# Patient Record
Sex: MALE | Race: WHITE | NOT HISPANIC OR LATINO | URBAN - METROPOLITAN AREA
[De-identification: names, ages, dates, MRNs, and addresses within clinical notes are randomized per-mention and may not be internally consistent; named-entity substitution may affect disease eponyms.]

---

## 2017-05-18 ENCOUNTER — PREPPED CHART (OUTPATIENT)
Dept: URBAN - METROPOLITAN AREA CLINIC 39 | Facility: CLINIC | Age: 62
End: 2017-05-18

## 2018-02-07 NOTE — PATIENT DISCUSSION
Posterior Capsular Fibrosis Counseling: The diagnosis of posterior capsular fibrosis (PCF), also referred to as a secondary cataract or posterior capsular opacification (PCO), was discussed with the patient. The patient understands that their symptoms and limitations are likely related to this condition. I have reviewed the risks, benefits and alternatives of  YAG laser surgery for the treatment of the fibrosis. The uncommon risk of an increase in intraocular pressure or a retinal detachment and their associated symptoms were explained to the patient. The patient understands and desires to proceed with the laser surgery to improve their vision for _driving and tv___.

## 2018-05-17 ENCOUNTER — ESTABLISHED COMPREHENSIVE EXAM (OUTPATIENT)
Dept: URBAN - METROPOLITAN AREA CLINIC 39 | Facility: CLINIC | Age: 63
End: 2018-05-17

## 2018-05-17 VITALS — HEART RATE: 63 BPM | SYSTOLIC BLOOD PRESSURE: 112 MMHG | DIASTOLIC BLOOD PRESSURE: 68 MMHG | HEIGHT: 60 IN

## 2018-05-17 DIAGNOSIS — H25.812: ICD-10-CM

## 2018-05-17 DIAGNOSIS — H04.123: ICD-10-CM

## 2018-05-17 DIAGNOSIS — H25.811: ICD-10-CM

## 2018-05-17 PROCEDURE — 1036F TOBACCO NON-USER: CPT

## 2018-05-17 PROCEDURE — G8783 BP SCRN PERF REC INTERVAL: HCPCS

## 2018-05-17 PROCEDURE — G8427 DOCREV CUR MEDS BY ELIG CLIN: HCPCS

## 2018-05-17 PROCEDURE — 92014 COMPRE OPH EXAM EST PT 1/>: CPT

## 2018-05-17 PROCEDURE — 92015 DETERMINE REFRACTIVE STATE: CPT

## 2018-05-17 ASSESSMENT — VISUAL ACUITY
OS_CC: 20/30+2
OD_CC: 20/25-2
OS_CC: J2
OS_SC: 20/200
OD_CC: J1
OD_SC: J12
OD_SC: 20/200
OS_SC: J12

## 2018-05-17 ASSESSMENT — TONOMETRY
OD_IOP_MMHG: 12
OS_IOP_MMHG: 12

## 2020-09-25 NOTE — PATIENT DISCUSSION
- Follow up in February with Dr. Macie Wang for annual [facility]

## 2020-09-25 NOTE — PATIENT DISCUSSION
- Not visually or functionally significant, but have progressed significantly in the past 6 months, likely due to hyperbaric oxygen treatments over the summer

## 2021-03-12 NOTE — PATIENT DISCUSSION
AMD (DRY), OU: +FH. PRESCRIBE AREDS 2 VITAMINS / AMSLER GRID QD/ UV PROTECTION. SMOKING CESSATION EMPHASIZED. RETURN FOR FOLLOW-UP AS SCHEDULED.  CANNOT RULE OUT RADIATION RETINOPATHY

## 2022-03-14 NOTE — PATIENT DISCUSSION
Considering Surgery Counseling: I have discussed the option of scheduling surgery versus following, as well as the risks, benefits and alternatives of cataract surgery with the patient. It was explained that the surgery is elective, there is no rush and there is no harm in waiting to have surgery. It was also explained that there is no guarantee that removing the cataract will improve their vision.

## 2022-04-04 NOTE — PATIENT DISCUSSION
"AMD (Dry): I have instructed the patient it is not necessary to take an AREDS 2 vitamin mixture to minimize the risk of developing ""wet"" macular degeneration yet. The importance of daily monitoring with Amsler grid was emphasized. New persistent blurring or distortion of vision should be evaluated. "

## 2022-04-14 NOTE — PATIENT DISCUSSION
Likely visually significant. Trial monovision with a contact lens prior to cataract surgery. Placed a -3.00 acuvue in the right eye for distance, she will use her left eye for near. Consider cataract surgery if visual symptoms persist once the right eye is corrected for distance. Plan for monovision at the time of cataract surgery if she desires to proceed OD distance and OS near. Repeat IOLM with post myopic lasik in the right eye when she returns in 2 weeks.

## 2022-04-28 NOTE — PATIENT DISCUSSION
Pt loved wearing a contact lens for monovision using the right eye for distance  and using her natural near vision in the left eye. Pt desires to proceed with cataract surgery in the right eye for distance.

## 2022-04-28 NOTE — PATIENT DISCUSSION
Visually significant. Schedule cataract surgery OD. Hold off on surgery in the left eye for now. Pt feels she is doing well at near os.

## 2022-04-28 NOTE — PATIENT DISCUSSION
Discussed the Light Adjustable Lens (MARCUS) with the patient. Told this lens gives the most flexibility regarding postoperative refractive outcome as it allows a non-surgical modification of the refractive error leading to less dependence on glasses. Patient made aware that this may require several visits and that the results depend on wearing UV protective glasses for about 3-5 weeks after surgery. Patient informed that the MARCUS may not be implanted on some occasions and need to be substituted by another lens if the pupil is found not to adequately dilate on the day of surgery. Patient informed that this is an elective out of pocket option not covered by payors. Also clearly discussed that there is no need to go with this option if wearing corrective glasses is acceptable.

## 2022-05-05 NOTE — PATIENT DISCUSSION
Proceed with cataract surgery, pt chooses to proceed with a goal of plano using a single vision lens.

## 2022-05-05 NOTE — PATIENT DISCUSSION
Discussed findings, not visually significant at this time. She still feels comfortable reading and doing things she likes to do os. Monitor.

## 2022-05-12 ENCOUNTER — NEW PATIENT (OUTPATIENT)
Dept: URBAN - METROPOLITAN AREA CLINIC 35 | Facility: CLINIC | Age: 67
End: 2022-05-12

## 2022-05-12 DIAGNOSIS — H04.123: ICD-10-CM

## 2022-05-12 DIAGNOSIS — H25.813: ICD-10-CM

## 2022-05-12 PROCEDURE — 92015 DETERMINE REFRACTIVE STATE: CPT

## 2022-05-12 PROCEDURE — 92004 COMPRE OPH EXAM NEW PT 1/>: CPT

## 2022-05-12 ASSESSMENT — VISUAL ACUITY
OS_CC: J6
OS_CC: 20/30-2
OD_CC: J10
OU_CC: J10
OU_CC: 20/25-2
OD_CC: 20/25

## 2022-05-12 ASSESSMENT — TONOMETRY
OD_IOP_MMHG: 13
OS_IOP_MMHG: 12

## 2022-05-27 ENCOUNTER — APPOINTMENT (OUTPATIENT)
Dept: CT IMAGING | Facility: HOSPITAL | Age: 67
End: 2022-05-27

## 2022-05-27 ENCOUNTER — HOSPITAL ENCOUNTER (EMERGENCY)
Facility: HOSPITAL | Age: 67
Discharge: HOME | End: 2022-05-27
Attending: EMERGENCY MEDICINE | Admitting: EMERGENCY MEDICINE

## 2022-05-27 VITALS
RESPIRATION RATE: 16 BRPM | SYSTOLIC BLOOD PRESSURE: 148 MMHG | HEIGHT: 73 IN | DIASTOLIC BLOOD PRESSURE: 83 MMHG | TEMPERATURE: 98.1 F | WEIGHT: 243.61 LBS | OXYGEN SATURATION: 99 % | BODY MASS INDEX: 32.29 KG/M2 | HEART RATE: 84 BPM

## 2022-05-27 DIAGNOSIS — S30.0XXA TRAUMATIC HEMATOMA OF BUTTOCK, INITIAL ENCOUNTER: ICD-10-CM
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
## 2022-05-27 PROCEDURE — 72192 CT PELVIS W/O DYE: CPT

## 2022-05-27 PROCEDURE — 99283 EMERGENCY DEPT VISIT LOW MDM: CPT

## 2022-05-27 RX ORDER — HYDROCODONE BITARTRATE AND ACETAMINOPHEN 7.5; 325 MG/1; MG/1
1 TABLET ORAL EVERY 6 HOURS PRN
Qty: 12 TABLET | Refills: 0 | Status: SHIPPED | OUTPATIENT
Start: 2022-05-27

## 2022-05-27 RX ORDER — HYDROCODONE BITARTRATE AND ACETAMINOPHEN 7.5; 325 MG/1; MG/1
1 TABLET ORAL ONCE
Status: COMPLETED | OUTPATIENT
Start: 2022-05-27 | End: 2022-05-27

## 2022-05-27 RX ADMIN — HYDROCODONE BITARTRATE AND ACETAMINOPHEN 1 TABLET: 7.5; 325 TABLET ORAL at 13:56

## 2022-05-27 NOTE — DISCHARGE INSTRUCTIONS
Your CT scan showed no fractures or bony injuries, but you obviously have a large 11 x 3.7 cm hematoma in the right buttock.    I did speak with the orthopedic surgeon on-call, who recommends only conservative treatment with supportive care measures such as trying to stay off of that area when you are in a seated position and also applying ice packs and taking some medication for pain control.    The hematoma should gradually reduce in size as it gets reabsorbed over the next week or 2.    You can follow-up with your doctor in [place] when you get home as needed for further pain control.

## 2022-05-27 NOTE — ED PROVIDER NOTES
"Time: 1235  Arrived by: Private vehicle  Chief Complaint: Right buttock injury from fall downstairs  History provided by: Patient    History of Present Illness:  Patient is a 67 y.o. year old male that presents to the emergency department with report of falling down the stairs last night landing directly on his right buttocks, now presents today with severe right buttock pain and swelling and bruising.    The pain is worse when he tries to sit down on the right side, and feels somewhat better when he stays off of the right side of his buttocks.    No rectal bleeding reported and no head or neck or spine injury reported.    He has not on any blood thinning medications.    He was in his normal state of health prior to the fall last night, and is here visiting from [place], [place].    He denies any recent fevers or chills or chest pain or dyspnea or vomiting or diarrhea or cough or congestion.    Similar Symptoms Previously: No  Recently seen: No      Patient Care Team  Primary Care Provider: Primary care physician in [place], where he lives    Past Medical History:   Reviewed, not on any anticoagulant medications.    No Known Allergies  History reviewed. No pertinent past medical history.  History reviewed. No pertinent surgical history.  History reviewed. No pertinent family history.    Home Medications:  Prior to Admission medications    Not on File        Record Review:  I have reviewed the patient's records in 8bit.     Review of Systems:  Review of Systems   I performed a 10 point review of systems which was all negative, except for the positives found in the HPI above.  Physical Exam:  /83 (BP Location: Left arm, Patient Position: Sitting)   Pulse 84   Temp 98.1 °F (36.7 °C) (Oral)   Resp 16   Ht 185.4 cm (73\")   Wt 110 kg (243 lb 9.7 oz)   SpO2 99%   BMI 32.14 kg/m²     Physical Exam   General: Awake alert and in moderate distress due to pain    HEENT: Head normocephalic atraumatic, eyes PERRLA " EOMI, nose normal, oropharynx normal.    Neck: Supple full range of motion, no meningismus, no lymphadenopathy    Heart: Regular rate and rhythm, no murmurs or rubs, 2+ radial pulses bilaterally    Lungs: Clear to auscultation bilaterally without wheezes or crackles, no respiratory distress    Abdomen: Soft, nontender, nondistended, no rebound or guarding    Skin: Warm, dry, no rash    Musculoskeletal: Large traumatic hematoma of right buttock with significant edema and ecchymosis and tenderness of the right buttock noted.  No bony tenderness of the hip or thoracolumbar spine, and no tenderness down the right thigh or leg.  Normal neurovascular status of the right leg.    Neurologic: Oriented x3, no motor deficits no sensory deficits    Psychiatric: Mood appears stable, no psychosis        Medications in the Emergency Department:  Medications   HYDROcodone-acetaminophen (NORCO) 7.5-325 MG per tablet 1 tablet (1 tablet Oral Given 5/27/22 1356)        Labs  Lab Results (last 24 hours)     ** No results found for the last 24 hours. **           Imaging:  CT Pelvis Without Contrast    Result Date: 5/27/2022  PROCEDURE: CT PELVIS WO CONTRAST  COMPARISON: None  INDICATIONS: eval large R buttock hematoma for underlying pelvic fx from fall down stairs  PROTOCOL:   Standard imaging protocol performed    RADIATION:   DLP: 1570 mGy*cm   Automated exposure control was utilized to minimize radiation dose.  TECHNIQUE: After obtaining the patient's consent, CT images were created without intravenous contrast.  Multiplanar imaging was performed.  FINDINGS:  No fracture or malalignment is seen.  There is a large focus of abnormal soft tissue density consistent with a hematoma in the right buttock posteriorly.  It measures 11.1 cm x 3.7 cm .  No adenopathy or free fluid is seen in the pelvis.  Pelvic viscera appear normal.  Mild left and moderate right hip osteoarthrosis is noted.  Mild to moderate osteoarthrosis changes of the  sacroiliac joints is noted bilaterally.        1. 11.1 cm x 3.7 cm hematoma in the posterior right buttock 2. No fracture or malalignment 3. Degenerative changes of the bilateral hip joints and SI joints.     Deniz Barnett M.D.       Electronically Signed and Approved By: Deniz Barnett M.D. on 5/27/2022 at 13:31                Procedures:  Procedures    Progress                            Medical Decision Making:  MDM     This patient is a pleasant healthy-appearing 67-year-old male who is here visiting from [place] and last night woke up to go to the bathroom and slipped down a flight of stairs injuring his right buttock.    He now has a significantly large and tense hematoma of the right buttock with significant pain and swelling and bruising present.    He is not on any anticoagulant medications and no active bleeding or laceration is seen.    I am giving him hydrocodone for pain control and also obtaining CT imaging of the pelvis to rule out any underlying fracture that caused this hematoma.    CT imaging just shows 3.7 cm x 11 cm hematoma in the right buttock with edema, but no bony injury.    I have consulted with our on-call orthopedic surgeon regarding any further recommendations for management of this large hematoma, but no aspiration or drainage was recommended and just conservative measures including rest and application of ice packs and pain meds and anti-inflammatories this week.        Final diagnoses:   Fall down stairs, initial encounter   Traumatic hematoma of buttock, initial encounter        Disposition:  ED Disposition     ED Disposition   Discharge    Condition   Stable    Comment   --                    Harpal Hendricks MD  05/27/22 2777

## 2022-08-12 ENCOUNTER — FOLLOW UP (OUTPATIENT)
Dept: URBAN - METROPOLITAN AREA CLINIC 35 | Facility: CLINIC | Age: 67
End: 2022-08-12

## 2022-08-12 DIAGNOSIS — H25.813: ICD-10-CM

## 2022-08-12 PROCEDURE — 99211T TECH SERVICE

## 2022-10-31 ENCOUNTER — CONSULTATION/EVALUATION (OUTPATIENT)
Dept: URBAN - METROPOLITAN AREA CLINIC 35 | Facility: CLINIC | Age: 67
End: 2022-10-31

## 2022-10-31 DIAGNOSIS — H25.813: ICD-10-CM

## 2022-10-31 DIAGNOSIS — H04.123: ICD-10-CM

## 2022-10-31 PROCEDURE — 92136TC INTERFEROMETRY - TECHNICAL COMPONENT

## 2022-10-31 PROCEDURE — V2799PMN IMPRIMIS PRED-MOXI-NEPAF 5ML

## 2022-10-31 PROCEDURE — 92014 COMPRE OPH EXAM EST PT 1/>: CPT

## 2022-10-31 ASSESSMENT — VISUAL ACUITY
OD_BAT: 20/60
OS_CC: 20/40
OS_BAT: 20/70
OD_CC: 20/25
OU_CC: J6

## 2023-05-15 ENCOUNTER — COMPREHENSIVE EXAM (OUTPATIENT)
Dept: URBAN - METROPOLITAN AREA CLINIC 35 | Facility: CLINIC | Age: 68
End: 2023-05-15

## 2023-05-15 DIAGNOSIS — H25.813: ICD-10-CM

## 2023-05-15 PROCEDURE — 92015 DETERMINE REFRACTIVE STATE: CPT

## 2023-05-15 PROCEDURE — 92014 COMPRE OPH EXAM EST PT 1/>: CPT

## 2023-05-15 ASSESSMENT — VISUAL ACUITY
OD_BAT: 20/60
OU_CC: J6
OD_CC: J8
OD_CC: 20/20
OS_PH: 20/40
OS_CC: J8
OU_CC: 20/20-1
OS_CC: 20/200-

## 2023-05-15 ASSESSMENT — TONOMETRY
OD_IOP_MMHG: 14
OS_IOP_MMHG: 15

## 2023-05-22 ENCOUNTER — CONSULTATION/EVALUATION (OUTPATIENT)
Dept: URBAN - METROPOLITAN AREA CLINIC 35 | Facility: CLINIC | Age: 68
End: 2023-05-22

## 2023-05-22 DIAGNOSIS — H25.813: ICD-10-CM

## 2023-05-22 PROCEDURE — 92025 CPTRIZED CORNEAL TOPOGRAPHY: CPT

## 2023-05-22 PROCEDURE — 92014 COMPRE OPH EXAM EST PT 1/>: CPT

## 2023-05-22 PROCEDURE — 92136TC INTERFEROMETRY - TECHNICAL COMPONENT

## 2023-05-22 ASSESSMENT — VISUAL ACUITY
OS_SC: 20/60+2
OD_SC: <J14
OS_AM: 20/20
OD_SC: 20/200
OS_CC: J2
OS_CC: 20/70
OD_CC: 20/25-2
OD_CC: J2
OS_SC: J12

## 2023-05-22 ASSESSMENT — TONOMETRY
OD_IOP_MMHG: 14
OS_IOP_MMHG: 15

## 2023-07-19 ENCOUNTER — SURGERY/PROCEDURE (OUTPATIENT)
Dept: URBAN - METROPOLITAN AREA CLINIC 35 | Facility: CLINIC | Age: 68
End: 2023-07-19

## 2023-07-19 ENCOUNTER — PRE-OP/H&P (OUTPATIENT)
Dept: URBAN - METROPOLITAN AREA SURGERY 14 | Facility: SURGERY | Age: 68
End: 2023-07-19

## 2023-07-19 DIAGNOSIS — H25.813: ICD-10-CM

## 2023-07-19 PROCEDURE — 66984AV REMOVE CATARACT, INSERT ADVANCED LENS

## 2023-07-19 PROCEDURE — 66999LNSR LENSAR LASER FOR CAT SX

## 2023-07-19 PROCEDURE — 99211T TECH SERVICE

## 2023-07-19 PROCEDURE — 65772LRI LRI DURING CAT SX

## 2023-07-20 ENCOUNTER — POST-OP (OUTPATIENT)
Dept: URBAN - METROPOLITAN AREA CLINIC 39 | Facility: CLINIC | Age: 68
End: 2023-07-20

## 2023-07-20 DIAGNOSIS — H25.811: ICD-10-CM

## 2023-07-20 DIAGNOSIS — Z96.1: ICD-10-CM

## 2023-07-20 PROCEDURE — 99024 POSTOP FOLLOW-UP VISIT: CPT

## 2023-07-20 ASSESSMENT — VISUAL ACUITY
OS: J10
OS_SC: J2
OS_SC: 20/20

## 2023-07-20 ASSESSMENT — TONOMETRY: OS_IOP_MMHG: 17

## 2023-07-24 ENCOUNTER — POST OP/EVAL OF SECOND EYE (OUTPATIENT)
Dept: URBAN - METROPOLITAN AREA CLINIC 35 | Facility: CLINIC | Age: 68
End: 2023-07-24

## 2023-07-24 DIAGNOSIS — H25.811: ICD-10-CM

## 2023-07-24 DIAGNOSIS — Z96.1: ICD-10-CM

## 2023-07-24 PROCEDURE — 92012 INTRM OPH EXAM EST PATIENT: CPT

## 2023-07-24 ASSESSMENT — TONOMETRY
OD_IOP_MMHG: 19
OS_IOP_MMHG: 20

## 2023-07-24 ASSESSMENT — VISUAL ACUITY
OS_SC: 20/30
OS_SC: J3
OD_BAT: 20/50
OD_SC: 20/200

## 2023-07-27 ENCOUNTER — SURGERY/PROCEDURE (OUTPATIENT)
Dept: URBAN - METROPOLITAN AREA CLINIC 35 | Facility: CLINIC | Age: 68
End: 2023-07-27

## 2023-07-27 DIAGNOSIS — H25.811: ICD-10-CM

## 2023-07-27 PROCEDURE — 65772LRI LRI DURING CAT SX

## 2023-07-27 PROCEDURE — 66984AV REMOVE CATARACT, INSERT ADVANCED LENS

## 2023-07-27 PROCEDURE — 66999LNSR LENSAR LASER FOR CAT SX

## 2023-07-28 ENCOUNTER — POST-OP (OUTPATIENT)
Dept: URBAN - METROPOLITAN AREA CLINIC 35 | Facility: CLINIC | Age: 68
End: 2023-07-28

## 2023-07-28 DIAGNOSIS — Z96.1: ICD-10-CM

## 2023-07-28 PROCEDURE — P6698455 NON-COMANAGED ADVANCED PO

## 2023-07-28 ASSESSMENT — VISUAL ACUITY
OD_SC: 20/25+1
OD_SC: J1

## 2023-07-28 ASSESSMENT — TONOMETRY: OD_IOP_MMHG: 13

## 2023-08-08 ENCOUNTER — POST-OP (OUTPATIENT)
Dept: URBAN - METROPOLITAN AREA CLINIC 35 | Facility: CLINIC | Age: 68
End: 2023-08-08

## 2023-08-08 DIAGNOSIS — Z96.1: ICD-10-CM

## 2023-08-08 PROCEDURE — 99024 POSTOP FOLLOW-UP VISIT: CPT

## 2023-08-08 ASSESSMENT — VISUAL ACUITY
OU: J3
OU_SC: 20/20
OS: J5
OD: J3
OS_SC: 20/20-2
OD_SC: J2
OU_SC: J1
OD_SC: 20/25-1
OS_SC: J2

## 2023-08-08 ASSESSMENT — TONOMETRY
OS_IOP_MMHG: 18
OD_IOP_MMHG: 18

## 2023-09-15 NOTE — PATIENT DISCUSSION
Is This A New Presentation, Or A Follow-Up?: Rash Slab Off:No Additional History: Used new soap and eye drop.

## 2023-10-30 ENCOUNTER — POST-OP (OUTPATIENT)
Dept: URBAN - METROPOLITAN AREA CLINIC 35 | Facility: CLINIC | Age: 68
End: 2023-10-30

## 2023-10-30 DIAGNOSIS — H26.493: ICD-10-CM

## 2023-10-30 DIAGNOSIS — Z96.1: ICD-10-CM

## 2023-10-30 ASSESSMENT — VISUAL ACUITY
OD: 20/30
OS: 20/30
OS_SC: J3
OU: 20/30
OD_SC: 20/40
OS_SC: 20/25
OU_SC: 20/30
OD_SC: J4
OU_SC: J1

## 2023-10-30 ASSESSMENT — TONOMETRY
OD_IOP_MMHG: 14
OS_IOP_MMHG: 15

## 2023-11-15 ENCOUNTER — SURGERY/PROCEDURE (OUTPATIENT)
Dept: URBAN - METROPOLITAN AREA SURGERY 14 | Facility: SURGERY | Age: 68
End: 2023-11-15

## 2023-11-15 DIAGNOSIS — H26.493: ICD-10-CM

## 2023-11-15 PROCEDURE — 6682150 YAG CAPSULOTOMY

## 2023-11-21 ENCOUNTER — POST-OP (OUTPATIENT)
Dept: URBAN - METROPOLITAN AREA CLINIC 35 | Facility: CLINIC | Age: 68
End: 2023-11-21

## 2023-11-21 DIAGNOSIS — Z96.1: ICD-10-CM

## 2023-11-21 ASSESSMENT — VISUAL ACUITY
OU: 20/16
OS_SC: J1
OS: 20/16
OS_SC: 20/30-1
OD: 20/20+
OD_SC: J1
OU_SC: J1
OU_SC: 20/25-1
OD_SC: 20/25-2

## 2023-11-21 ASSESSMENT — TONOMETRY
OS_IOP_MMHG: 15
OD_IOP_MMHG: 15

## 2024-02-06 ENCOUNTER — POST-OP (OUTPATIENT)
Dept: URBAN - METROPOLITAN AREA CLINIC 35 | Facility: CLINIC | Age: 69
End: 2024-02-06

## 2024-02-06 DIAGNOSIS — H04.123: ICD-10-CM

## 2024-02-06 DIAGNOSIS — Z96.1: ICD-10-CM

## 2024-02-06 PROCEDURE — 99024 POSTOP FOLLOW-UP VISIT: CPT

## 2024-02-06 ASSESSMENT — VISUAL ACUITY
OS_SC: 20/20-1
OU: 20/20
OU_SC: 20/20
OS_SC: J2
OU_SC: J1
OD_SC: J2
OD_SC: 20/20-2
OD: 20/20
OS: 20/20

## 2024-02-06 ASSESSMENT — TONOMETRY
OD_IOP_MMHG: 13
OS_IOP_MMHG: 13

## 2024-07-31 NOTE — PATIENT DISCUSSION
"AMD (Dry): I have instructed the patient it is not necessary to take an AREDS 2 vitamin mixture to minimize the risk of developing ""wet"" macular degeneration yet. The importance of daily monitoring with Amsler grid was emphasized. New persistent blurring or distortion of vision should be evaluated. " n/a

## 2025-01-07 ENCOUNTER — COMPREHENSIVE EXAM (OUTPATIENT)
Age: 70
End: 2025-01-07

## 2025-01-07 DIAGNOSIS — H04.123: ICD-10-CM

## 2025-01-07 DIAGNOSIS — Z96.1: ICD-10-CM

## 2025-01-07 PROCEDURE — 92014 COMPRE OPH EXAM EST PT 1/>: CPT

## 2025-07-02 ENCOUNTER — TREATMENT (OUTPATIENT)
Dept: PHYSICAL THERAPY | Facility: CLINIC | Age: 70
End: 2025-07-02
Payer: MEDICARE

## 2025-07-02 DIAGNOSIS — M54.2 PAIN, NECK: ICD-10-CM

## 2025-07-02 DIAGNOSIS — M43.6 STIFFNESS OF CERVICAL SPINE: ICD-10-CM

## 2025-07-02 DIAGNOSIS — Z98.1 S/P CERVICAL SPINAL FUSION: Primary | ICD-10-CM

## 2025-07-02 NOTE — PROGRESS NOTES
Physical Therapy Initial Evaluation and Plan of Care                    [facility] [address] [place] [address]    Patient: Shahram Andres   : 1955  Diagnosis/ICD-10 Code:  S/P cervical spinal fusion [Z98.1]  Referring practitioner: Caity Desouza MD  Date of Initial Visit: 2025  Today's Date: 2025  Patient seen for 1 sessions           Subjective Questionnaire: NDI:15/50 = 30% Disability    Subjective Evaluation    History of Present Illness  Mechanism of injury: The patient presents to physical therapy s/p ACDF of C6/7 from 3/6/25. He has been attending PT in [place] from April- prior to moving back to [place] for the next few weeks. His pain is well controlled with Tramadol and the occasional Aleve. His pain is located in the back of his neck. He also has pain in the joints of his hands. The shooting pains into his arms have improved since surgery. He can tell that his neck is still stiff. He spends a lot of time at his computer for work and his neck gets sore by the end of the day.      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       No past medical history on file.   No past surgical history on file.    Objective          Static Posture     Head  Forward.    Shoulders  Rounded.    Tenderness     Additional Tenderness Details  Tenderness bilaterally in upper trapezius, cervical paraspinals, scalenes, SCM    Neurological Testing     Additional Neurological Details  Sensation to light touch intact and equal bilaterally from C2-T1 dermatomal pattern.    Active Range of Motion   Cervical/Thoracic Spine   Cervical    Flexion: 40 degrees   Extension: 28 degrees   Left lateral flexion: 14 degrees   Right  lateral flexion: 15 degrees   Left rotation: 40 degrees with pain  Right rotation: 55 degrees     Strength/Myotome Testing     Left Shoulder     Planes of Motion   Flexion: 4+   Abduction: 4+   External rotation at 0°: 5   Internal rotation at 0°: 5     Right Shoulder     Planes of Motion   Flexion: 4+   Abduction: 4+   External rotation at 0°: 5   Internal rotation at 0°: 5     Left Elbow   Flexion: 5    Right Elbow   Flexion: 5    Left Wrist/Hand   Wrist extension: 5     (2nd hand position)   Left  strength (2nd hand position) 90 lbs    Right Wrist/Hand   Wrist extension: 5     (2nd hand position)   Right  strength (2nd hand position) 95 lbs    Ambulation     Ambulation: Level Surfaces     Additional Level Surfaces Ambulation Details  The patient ambulates with normal biomechanics.      See Exercise, Manual, and Modality Logs for complete treatment.     Assessment & Plan       Assessment  Impairments: abnormal muscle firing, abnormal muscle tone, abnormal or restricted ROM, activity intolerance, impaired physical strength, lacks appropriate home exercise program, pain with function and safety issue   Functional limitations: carrying objects, lifting, pulling, pushing, uncomfortable because of pain and unable to perform repetitive tasks   Assessment details: The patient presents to physical therapy s/p ACDF of C6-7 on 3/6/25. He presents with associated cervical stiffness, tenderness to palpation, postural deficits, and functional limitations (NDI). He would benefit from skilled physical therapy as described below to address these limitations and allow the patient to return to his prior level of function.   Prognosis: good    Goals  Plan Goals: CERVICAL PROBLEMS    1. The patient has limited ROM.    LTG 1: 12 weeks:  The patient will demonstrate 60° of bilateral cervical spine rotation, 25° of bilateral cervical side bending, 45° of cervical flexion, and 35° of cervical extension in order to  adequately monitor blind spots while driving and improve ability to perform activities of daily living.    STATUS:  New  STG 1a: 6 weeks:  The patient will demonstrate 55° of bilateral cervical spine rotation, 20° of bilateral cervical side bending,  and 30° of cervical extension in order to adequately monitor blind spots while driving and improve ability to perform activities of daily living.       STATUS:  New     2. The patient has complaints of pain.   LTG 2: 12 weeks:  The patient will report 1/10 pain in order to more easily tolerate activities of daily living and improve sleep quality.    STATUS:  New   STG 2a: 6 weeks:  The patient will report 3/10 pain.    STATUS:  New    3. Carrying, Moving, and Handling Objects Functional Limitation     LTG 3: 12 weeks:  The patient will demonstrate 10% limitation by achieving a score of 5/50 on the Neck Disability Index.    STATUS:  New   STG 3a: 6 weeks:  The patient will demonstrate 20% limitation by achieving a score of 10/50 on the Neck Disability Index.      STATUS:  New     Plan  Therapy options: will be seen for skilled therapy services  Planned modality interventions: cryotherapy, electrical stimulation/Russian stimulation, TENS, traction, ultrasound and dry needling  Planned therapy interventions: ADL retraining, soft tissue mobilization, strengthening, stretching, therapeutic activities, joint mobilization, home exercise program, functional ROM exercises, flexibility, body mechanics training, postural training, neuromuscular re-education, manual therapy, motor coordination training, spinal/joint mobilization and IADL retraining  Frequency: 2x week  Duration in weeks: 12  Treatment plan discussed with: patient        Visit Diagnoses:    ICD-10-CM ICD-9-CM   1. S/P cervical spinal fusion  Z98.1 V45.4   2. Pain, neck  M54.2 723.1   3. Stiffness of cervical spine  M43.6 723.5       History # of Personal Factors and/or Comorbidities: MODERATE (1-2)  Examination of  Body System(s): # of elements: LOW (1-2)  Clinical Presentation: Stable  Clinical Decision Making: LOW       Timed:         Manual Therapy:    15     mins  48677;     Therapeutic Exercise:    10     mins  90890;     Neuromuscular Mike:    0    mins  67987;    Therapeutic Activity:     0     mins  08685;     Gait Trainin     mins  17439;     Ultrasound:     0     mins  45780;    Ionto                               0    mins   35315  Self Care                       0     mins   27651  Canalith Repos    0     mins 19257      Un-Timed:  Electrical Stimulation:    0     mins  75666 ( );  Dry Needling     0     mins self-pay  Traction     0     mins 31405  Low Eval     25     Mins  15860  Mod Eval     0     Mins  66662  High Eval                       0     Mins  50178  Re-Eval                           0    mins  69220    Timed Treatment:   25   mins   Total Treatment:     50   mins    PT SIGNATURE: Neymar Alarcon PT    Electronically signed 2025    [place] License: PT - [id number]      Initial Certification  Certification Period: 2025 thru 2025  I certify that the therapy services are furnished while this patient is under my care.  The services outlined above are required by this patient, and will be reviewed every 90 days.     PHYSICIAN: Caity Desouza MD  NPI: [id number]      DATE:     Please sign and return via fax to [phone]. Thank you, [facility].

## 2025-07-07 ENCOUNTER — TREATMENT (OUTPATIENT)
Dept: PHYSICAL THERAPY | Facility: CLINIC | Age: 70
End: 2025-07-07
Payer: MEDICARE

## 2025-07-07 DIAGNOSIS — M43.6 STIFFNESS OF CERVICAL SPINE: ICD-10-CM

## 2025-07-07 DIAGNOSIS — Z98.1 S/P CERVICAL SPINAL FUSION: Primary | ICD-10-CM

## 2025-07-07 DIAGNOSIS — M54.2 PAIN, NECK: ICD-10-CM

## 2025-07-07 PROCEDURE — 97530 THERAPEUTIC ACTIVITIES: CPT | Performed by: PHYSICAL THERAPIST

## 2025-07-07 PROCEDURE — 97140 MANUAL THERAPY 1/> REGIONS: CPT | Performed by: PHYSICAL THERAPIST

## 2025-07-07 NOTE — PROGRESS NOTES
Physical Therapy Daily Treatment Note                      [facility] [address] [place] [address]    Patient: Shahram Andres   : 1955  Diagnosis/ICD-10 Code:  S/P cervical spinal fusion [Z98.1]  Referring practitioner: Caity Desouza MD  Date of Initial Visit: Type: THERAPY  Noted: 2025  Today's Date: 2025  Patient seen for 2 sessions           Subjective   The patient reported no new complaints today.    Objective   Left cervical rotation: 55 degrees  Right cervical rotation: 58 degrees    See Exercise, Manual, and Modality Logs for complete treatment.     Assessment/Plan    The patient demonstrated good tolerance to manual therapy and postural re-ed exercise today. Cervical rotation ROM was improved compared to initial evaluation. Continue to progress with current PT plan of care per patient tolerance.         Timed:  Manual Therapy:    25     mins  30410;  Therapeutic Exercise:    0     mins  20627;     Neuromuscular Mike:   0    mins  84232;    Therapeutic Activity:     15     mins  04626;     Gait Trainin     mins  93914;     Aquatics                         0      mins  10571    Un-timed:  Mechanical Traction      0     mins  63139  Dry Needling     0     mins self-pay  Electrical Stimulation:    0     mins  85030 ( );      Timed Treatment:   40   mins   Total Treatment:     45   mins    Neymar Alarcon PT  Physical Therapist    Electronically signed 2025    [place] License: PT - [id number]

## 2025-07-09 ENCOUNTER — TREATMENT (OUTPATIENT)
Dept: PHYSICAL THERAPY | Facility: CLINIC | Age: 70
End: 2025-07-09
Payer: MEDICARE

## 2025-07-09 DIAGNOSIS — M43.6 STIFFNESS OF CERVICAL SPINE: ICD-10-CM

## 2025-07-09 DIAGNOSIS — Z98.1 S/P CERVICAL SPINAL FUSION: Primary | ICD-10-CM

## 2025-07-09 DIAGNOSIS — M54.2 PAIN, NECK: ICD-10-CM

## 2025-07-09 NOTE — PROGRESS NOTES
Physical Therapy Daily Treatment Note                      [facility] [address] [place] [address]    Patient: Shahram Andres   : 1955  Diagnosis/ICD-10 Code:  S/P cervical spinal fusion [Z98.1]  Referring practitioner: Caity Desouza MD  Date of Initial Visit: Type: THERAPY  Noted: 2025  Today's Date: 2025  Patient seen for 3 sessions           Subjective   The patient reported no new complaints today. He did have some soreness in his upper thoracic spine after treatment today.    Objective   See Exercise, Manual, and Modality Logs for complete treatment.     Assessment/Plan    The patient demonstrated good tolerance to all functional scapular stabilization/postural re-ed exercise. Continue to progress with current PT plan of care per patient tolerance.         Timed:  Manual Therapy:    20     mins  39651;  Therapeutic Exercise:    0     mins  22941;     Neuromuscular Mike:   0    mins  72565;    Therapeutic Activity:     20     mins  29230;     Gait Trainin     mins  87539;     Aquatics                         0      mins  38915    Un-timed:  Mechanical Traction      0     mins  84870  Dry Needling     0     mins self-pay  Electrical Stimulation:    0     mins  32316 ( );      Timed Treatment:   40   mins   Total Treatment:     40   mins    Neymar Alarcon PT  Physical Therapist    Electronically signed 2025    [place] License: PT - [id number]

## 2025-07-15 ENCOUNTER — TREATMENT (OUTPATIENT)
Dept: PHYSICAL THERAPY | Facility: CLINIC | Age: 70
End: 2025-07-15
Payer: MEDICARE

## 2025-07-15 DIAGNOSIS — Z98.1 S/P CERVICAL SPINAL FUSION: Primary | ICD-10-CM

## 2025-07-15 DIAGNOSIS — M54.2 PAIN, NECK: ICD-10-CM

## 2025-07-15 DIAGNOSIS — M43.6 STIFFNESS OF CERVICAL SPINE: ICD-10-CM

## 2025-07-15 NOTE — PROGRESS NOTES
Physical Therapy Daily Treatment Note                      [facility] [address] [place] [address]    Patient: Shahram Andres   : 1955  Diagnosis/ICD-10 Code:  S/P cervical spinal fusion [Z98.1]  Referring practitioner: Caity Desouza MD  Date of Initial Visit: Type: THERAPY  Noted: 2025  Today's Date: 7/15/2025  Patient seen for 4 sessions           Subjective   The patient reported that he didn't sleep well last night, but not necessarily due to his neck pain.    Objective   See Exercise, Manual, and Modality Logs for complete treatment.     Assessment/Plan    The patient demonstrated good tolerance to manual therapy and postural re-education exercise today. Continue to progress with current PT plan of care per patient tolerance.         Timed:  Manual Therapy:    20     mins  07527;  Therapeutic Exercise:    0     mins  65140;     Neuromuscular Mike:   0    mins  57685;    Therapeutic Activity:     18     mins  93481;     Gait Trainin     mins  77975;     Aquatics                         0      mins  82114    Un-timed:  Mechanical Traction      0     mins  86265  Dry Needling     0     mins self-pay  Electrical Stimulation:    0     mins  83278 ( );      Timed Treatment:   38   mins   Total Treatment:     38   mins    Neymar Alarcon PT  Physical Therapist    Electronically signed 7/15/2025    [place] License: PT - [id number]

## 2025-07-17 ENCOUNTER — TREATMENT (OUTPATIENT)
Dept: PHYSICAL THERAPY | Facility: CLINIC | Age: 70
End: 2025-07-17
Payer: MEDICARE

## 2025-07-17 DIAGNOSIS — M43.6 STIFFNESS OF CERVICAL SPINE: ICD-10-CM

## 2025-07-17 DIAGNOSIS — Z98.1 S/P CERVICAL SPINAL FUSION: Primary | ICD-10-CM

## 2025-07-17 DIAGNOSIS — M54.2 PAIN, NECK: ICD-10-CM

## 2025-07-17 NOTE — PROGRESS NOTES
Physical Therapy Daily Treatment Note                      [facility] [address] [place] [address]    Patient: Shahram Andres   : 1955  Diagnosis/ICD-10 Code:  S/P cervical spinal fusion [Z98.1]  Referring practitioner: Caity Desouza MD  Date of Initial Visit: Type: THERAPY  Noted: 2025  Today's Date: 2025  Patient seen for 5 sessions           Subjective   The patient reported no new complaints today. He feels like his legs are moving better than when he started PT.     Objective   See Exercise, Manual, and Modality Logs for complete treatment.     Assessment/Plan    The patient demonstrated good tolerance to progression of thoracic mobility exercise and manual therapy. Continue to progress with current PT plan of care per patient tolerance.         Timed:  Manual Therapy:    15     mins  18681;  Therapeutic Exercise:    10     mins  99489;     Neuromuscular Mike:   0    mins  56082;    Therapeutic Activity:     15     mins  32308;     Gait Trainin     mins  78027;     Aquatics                         0      mins  06837    Un-timed:  Mechanical Traction      0     mins  31060  Dry Needling     0     mins self-pay  Electrical Stimulation:    0     mins  75017 ( );      Timed Treatment:   40   mins   Total Treatment:     40   mins    Neymar Alarcon PT  Physical Therapist    Electronically signed 2025    [place] License: PT - [id number]

## 2025-07-21 ENCOUNTER — TREATMENT (OUTPATIENT)
Dept: PHYSICAL THERAPY | Facility: CLINIC | Age: 70
End: 2025-07-21
Payer: MEDICARE

## 2025-07-21 DIAGNOSIS — M43.6 STIFFNESS OF CERVICAL SPINE: ICD-10-CM

## 2025-07-21 DIAGNOSIS — Z98.1 S/P CERVICAL SPINAL FUSION: Primary | ICD-10-CM

## 2025-07-21 DIAGNOSIS — M54.2 PAIN, NECK: ICD-10-CM

## 2025-07-21 PROCEDURE — 97110 THERAPEUTIC EXERCISES: CPT | Performed by: PHYSICAL THERAPIST

## 2025-07-21 PROCEDURE — 97112 NEUROMUSCULAR REEDUCATION: CPT | Performed by: PHYSICAL THERAPIST

## 2025-07-21 PROCEDURE — 97140 MANUAL THERAPY 1/> REGIONS: CPT | Performed by: PHYSICAL THERAPIST

## 2025-07-21 NOTE — PROGRESS NOTES
Physical Therapy Daily Treatment Note                      [facility] [address] [place] [address]    Patient: Shahram Andres   : 1955  Diagnosis/ICD-10 Code:  S/P cervical spinal fusion [Z98.1]  Referring practitioner: Caity Desouza MD  Date of Initial Visit: Type: THERAPY  Noted: 2025  Today's Date: 2025  Patient seen for 6 sessions           Subjective   The patient reported that he was able to get out and play 9 holes of golf over the weekend. He didn't have significantly increased pain afterwards.    Objective   See Exercise, Manual, and Modality Logs for complete treatment.     Assessment/Plan    The patient continues to demonstrate good tolerance to functional strengthening exercise. We continue to utilize manual therapy to address remaining joint/soft tissue stiffness in upper cervical spine and upper thoracic spine.         Timed:  Manual Therapy:    15     mins  57515;  Therapeutic Exercise:    8     mins  39545;     Neuromuscular Mike:   15    mins  18873;    Therapeutic Activity:     0     mins  81200;     Gait Trainin     mins  95806;     Aquatics                         0      mins  65650    Un-timed:  Mechanical Traction      0     mins  15423  Dry Needling     0     mins self-pay  Electrical Stimulation:    0     mins  72747 ( );      Timed Treatment:   38   mins   Total Treatment:     38   mins    Neymar Alarcon PT  Physical Therapist    Electronically signed 2025    [place] License: PT - [id number]

## 2025-07-23 ENCOUNTER — TREATMENT (OUTPATIENT)
Dept: PHYSICAL THERAPY | Facility: CLINIC | Age: 70
End: 2025-07-23
Payer: MEDICARE

## 2025-07-23 DIAGNOSIS — M54.2 PAIN, NECK: ICD-10-CM

## 2025-07-23 DIAGNOSIS — M43.6 STIFFNESS OF CERVICAL SPINE: ICD-10-CM

## 2025-07-23 DIAGNOSIS — Z98.1 S/P CERVICAL SPINAL FUSION: Primary | ICD-10-CM

## 2025-07-23 NOTE — PROGRESS NOTES
Re-evaluation / Discharge                      [facility] [address] [place] [address]    Patient: Shahram Andres   : 1955  Diagnosis/ICD-10 Code:  S/P cervical spinal fusion [Z98.1]  Referring practitioner: Caity Desouza MD  Date of Initial Visit: Type: THERAPY  Noted: 2025  Today's Date: 2025  Patient seen for 7 sessions           Subjective Questionnaire: NDI: = 14% Disability     Subjective   The patient reported that he will be traveling back to [place] for the next month after todays visit. He will resume physical therapy at a clinic there. His pain today very mild in his neck and thoracic spine. He does still have pain in his low back. He feels like his condition has improved over the past month in PT.     Objective   Static Posture      Head  Forward.     Shoulders  Rounded.     Tenderness      Additional Tenderness Details  Tenderness bilaterally in upper trapezius, cervical paraspinals, scalenes, SCM     Neurological Testing      Additional Neurological Details  Sensation to light touch intact and equal bilaterally from C2-T1 dermatomal pattern.     Active Range of Motion   Cervical/Thoracic Spine   Cervical     Flexion: 45 degrees   Extension: 33 degrees   Left lateral flexion: 25 degrees   Right lateral flexion: 25 degrees   Left rotation: 62 degrees with pain  Right rotation: 65 degrees      Strength/Myotome Testing      Left Shoulder      Planes of Motion   Flexion: 4+   Abduction: 4+   External rotation at 0°: 5   Internal rotation at 0°: 5      Right Shoulder      Planes of Motion   Flexion: 4+   Abduction: 4+   External rotation at 0°: 5   Internal rotation at 0°: 5      Left Elbow   Flexion: 5     Right Elbow   Flexion: 5     Left Wrist/Hand   Wrist extension: 5      (2nd hand position)   Left  strength (2nd hand position) 115 lbs     Right Wrist/Hand   Wrist extension: 5      (2nd hand position)   Right  strength (2nd hand position)  110 lbs    See Exercise, Manual, and Modality Logs for complete treatment.     Assessment/Plan  Shahram demonstrated good tolerance to manual therapy and functional strengthening exercise today. He was re-evaluated today and presents with improvements in cervical ROM, shoulder strength, and function (NDI) compared to his initial evaluation. He will be discharged from this facility at this time as he will be traveling to [place] for an extended stay. He would benefit from continued PT there for this condition.    Goals  Plan Goals: CERVICAL PROBLEMS     1. The patient has limited ROM.                       LTG 1: 12 weeks:  The patient will demonstrate 60° of bilateral cervical spine rotation, 25° of bilateral cervical side bending, 45° of cervical flexion, and 35° of cervical extension in order to adequately monitor blind spots while driving and improve ability to perform activities of daily living.                          STATUS:  Progressing  STG 1a: 6 weeks:  The patient will demonstrate 55° of bilateral cervical spine rotation, 20° of bilateral cervical side bending,  and 30° of cervical extension in order to adequately monitor blind spots while driving and improve ability to perform activities of daily living.                                      STATUS:  Met                2. The patient has complaints of pain.              LTG 2: 12 weeks:  The patient will report 1/10 pain in order to more easily tolerate activities of daily living and improve sleep quality.                          STATUS:  Met              STG 2a: 6 weeks:  The patient will report 3/10 pain.                          STATUS:  Met     3. Carrying, Moving, and Handling Objects Functional Limitation                               LTG 3: 12 weeks:  The patient will demonstrate 10% limitation by achieving a score of 5/50 on the Neck Disability Index.                          STATUS:  Progressing              STG 3a: 6 weeks:  The patient will  demonstrate 20% limitation by achieving a score of 10/50 on the Neck Disability Index.                            STATUS:  Met         Timed:  Manual Therapy:    0     mins  27201;  Therapeutic Exercise:    10     mins  23834;     Neuromuscular Mike:   0    mins  15786;    Therapeutic Activity:     15     mins  38237;     Gait Trainin     mins  50483;     Aquatics                         0      mins  38917    Un-timed:  Mechanical Traction      0     mins  48486  Dry Needling     0     mins self-pay  Electrical Stimulation:    0     mins  37656 ( )  Re-evaluation:               10   ; mins [id number]      Timed Treatment:   25   mins   Total Treatment:     35   mins    Neymar Alarcon PT  Physical Therapist    Electronically signed 2025    [place] License: PT - [id number]

## 2025-08-18 ENCOUNTER — TELEPHONE (OUTPATIENT)
Dept: PHYSICAL THERAPY | Facility: CLINIC | Age: 70
End: 2025-08-18
Payer: MEDICARE